# Patient Record
Sex: MALE | Race: WHITE
[De-identification: names, ages, dates, MRNs, and addresses within clinical notes are randomized per-mention and may not be internally consistent; named-entity substitution may affect disease eponyms.]

---

## 2017-02-27 ENCOUNTER — HOSPITAL ENCOUNTER (OUTPATIENT)
Age: 22
End: 2017-02-27
Payer: MEDICAID

## 2017-02-27 DIAGNOSIS — R30.0: Primary | ICD-10-CM

## 2017-07-25 ENCOUNTER — HOSPITAL ENCOUNTER (EMERGENCY)
Dept: HOSPITAL 76 - ED | Age: 22
LOS: 1 days | Discharge: HOME | End: 2017-07-26
Payer: MEDICAID

## 2017-07-25 DIAGNOSIS — R39.14: ICD-10-CM

## 2017-07-25 DIAGNOSIS — R33.9: Primary | ICD-10-CM

## 2017-07-25 LAB
CUL URINE ADD CHARGE: (no result)
PH UR STRIP.AUTO: 6.5 PH (ref 5–7.5)
SP GR UR STRIP.AUTO: 1.02 (ref 1–1.03)
UA CHARGE (STRIP ONLY): (no result)
UA W/ MICROSCOPIC CHARGE: YES
UROBILINOGEN UR STRIP.AUTO-MCNC: NEGATIVE MG/DL

## 2017-07-25 PROCEDURE — 81003 URINALYSIS AUTO W/O SCOPE: CPT

## 2017-07-25 PROCEDURE — 87086 URINE CULTURE/COLONY COUNT: CPT

## 2017-07-25 PROCEDURE — 51798 US URINE CAPACITY MEASURE: CPT

## 2017-07-25 PROCEDURE — 99283 EMERGENCY DEPT VISIT LOW MDM: CPT

## 2017-07-25 PROCEDURE — 87077 CULTURE AEROBIC IDENTIFY: CPT

## 2017-07-25 PROCEDURE — 81001 URINALYSIS AUTO W/SCOPE: CPT

## 2017-07-25 NOTE — ED PHYSICIAN DOCUMENTATION
PD HPI MALE 





- Stated complaint


Stated Complaint: MALE 





- Chief complaint


Chief Complaint: UTI





- History obtained from


History obtained from: Patient





- History of Present Illness


Timing - onset: How many weeks ago (he has had difficulty urinating with weaker 

stream and dribbling/ some nocturnal incontinence for past 2 1/2 years and 

prior to that. Had these symptoms prior to that and had surgery by Urology at 

[facility] for some scar tissue in urethra. He denies UTIs nor STDs prior to 

that. He says he did not urinate strongly even after surgery. However has had 

increased pains and straining with urination the past several months. Feeling 

narvaez bladder the past week or so, with burning on urination.)


Timing - duration: Weeks


Timing - details: Gradual onset, Still present, Waxing and waning


Associated symptoms: Dysuria, Urinary frequency, Unable to urinate (withy 

dribbling and then incontinence during sleep. He says he is having some raw 

skin in crural and underside scrotal area from wetness. He is not using any 

ointment/creams.).  No: Genital sore / lesion, Testiclar pain, Scrotal swelling


PD HPI MALE  CONTRIB FACTORS: Not sexually active.  No: Exposed to STD


Similar symptoms before: Diagnosis (urethral strictures)


Recently seen: Surgery (about 2 1/2 years ago at [facility].)





Review of Systems


Constitutional: denies: Fever, Chills


GI: denies: Vomiting, Diarrhea


: reports: Dysuria, Frequency, Incontinent


Skin: reports: Rash (he says some redness in crural and scrotal area when has 

wetness overnight from incontinence.).  denies: Lesions


Musculoskeletal: denies: Back pain





PD PAST MEDICAL HISTORY





- Past Medical History


: Retention (due to urethral strictures)





- Past Surgical History


Past Surgical History: No





- Present Medications


Home Medications: 


 Ambulatory Orders











 Medication  Instructions  Recorded  Confirmed


 


Clotrimazole/Betamethasone Dip 1 applic TP BID #15 cream..g. 07/25/17 





[Clotrimazole-Betamethasone Crm]   


 


Phenazopyridine [Pyridium] 200 mg PO TID PRN #15 tablet 07/25/17 


 


Sulfamethox/Trimeth 800/160 1 each PO BID #14 tablet 07/25/17 





[Bactrim Ds 800/160]   














- Allergies


Allergies/Adverse Reactions: 


 Allergies











Allergy/AdvReac Type Severity Reaction Status Date / Time


 


No Known Drug Allergies Allergy   Verified 06/29/13 01:39














- Social History


Does the pt smoke?: No


Smoking Status: Never smoker


Does the pt drink ETOH?: No


Does the pt have substance abuse?: No





- Immunizations


Immunizations are current?: Yes





- POLST


Patient has POLST: No





PD ED PE NORMAL





- Vitals


Vital signs reviewed: Yes





- General


General: Alert and oriented X 3, No acute distress, Well developed/nourished





- Abdomen


Abdomen: Normal bowel sounds, Soft, Non tender, Non distended, No organomegaly





- Male 


Male : Pt declined (he is very shy and embarrassed about having genital exam)





- Derm


Derm: Normal color, Warm and dry





- Neuro


Neuro: Alert and oriented X 3, No motor deficit, Normal speech





Results





- Vitals


Vitals: 


 Vital Signs - 24 hr











  07/25/17





  21:54


 


Temperature 36.6 C


 


Heart Rate 91


 


Respiratory 18





Rate 


 


Blood Pressure 133/63 H


 


O2 Saturation 100








 Oxygen











O2 Source                      Room air

















PD MEDICAL DECISION MAKING





- ED course


Complexity details: reviewed results (bladder scanner shows 300 ml and he voids 

only about 50-60 ml. However it does sound rather chronic, and he has strictures

, so would rather not place cantu if he is able to empty enough. Will refer to 

[facility] [place], since he is older than [facility] at this time. ), considered 

differential, d/w patient





Departure





- Departure


Disposition: 01 Home, Self Care


Clinical Impression: 


 Urinary retention with incomplete bladder emptying





UTI (urinary tract infection)


Qualifiers:


 Urinary tract infection type: acute cystitis Hematuria presence: without 

hematuria Qualified Code(s): N30.00 - Acute cystitis without hematuria


Condition: Stable


Record reviewed to determine appropriate education?: Yes


Instructions:  ED Retention Urinary Male, ED UTI Cystitis Male


Follow-Up: 


[facility] [Provider Group]


Prescriptions: 


Sulfamethox/Trimeth 800/160 [Bactrim Ds 800/160] 1 each PO BID #14 tablet


Clotrimazole/Betamethasone Dip [Clotrimazole-Betamethasone Crm] 1 applic TP BID 

#15 cream..g.


Phenazopyridine [Pyridium] 200 mg PO TID PRN #15 tablet


 PRN Reason: Pain


Comments: 


Increase water intake for better hydration. Bactrim twice daily for a week for 

likely infection. Pyridium to help with discomfort of urination. You are not 

emptying your bladder well, but still getting some out (the leaking and 

dribbling) so I would prefer not placing catheter due to the scarring/etc. Call 

Urology office in the morning for an appt in the next few days. Use Lotrisone 

cream for the scrotal rash then cover over that with protective ointment such 

as A&D.

## 2017-07-26 VITALS — SYSTOLIC BLOOD PRESSURE: 127 MMHG | DIASTOLIC BLOOD PRESSURE: 78 MMHG

## 2017-07-26 LAB
UR CULTURE IF IND: (no result)
WBC # UR MANUAL: >25 /HPF (ref 0–3)

## 2017-11-13 ENCOUNTER — HOSPITAL ENCOUNTER (OUTPATIENT)
Dept: HOSPITAL 76 - LAB | Age: 22
Discharge: HOME | End: 2017-11-13
Attending: UROLOGY
Payer: MEDICAID

## 2017-11-13 DIAGNOSIS — N35.9: Primary | ICD-10-CM

## 2017-11-13 LAB
CUL URINE ADD CHARGE: (no result)
PH UR STRIP.AUTO: 6.5 PH (ref 5–7.5)
SP GR UR STRIP.AUTO: 1.02 (ref 1–1.03)
UA CHARGE (STRIP ONLY): (no result)
UA W/ MICROSCOPIC CHARGE: YES
UR CULTURE IF IND: (no result)
UROBILINOGEN UR STRIP.AUTO-MCNC: NEGATIVE MG/DL

## 2017-11-13 PROCEDURE — 81001 URINALYSIS AUTO W/SCOPE: CPT

## 2017-11-13 PROCEDURE — 87086 URINE CULTURE/COLONY COUNT: CPT

## 2017-11-13 PROCEDURE — 81003 URINALYSIS AUTO W/O SCOPE: CPT

## 2020-03-13 ENCOUNTER — HOSPITAL ENCOUNTER (EMERGENCY)
Dept: HOSPITAL 76 - ED | Age: 25
Discharge: HOME | End: 2020-03-13
Payer: MEDICAID

## 2020-03-13 VITALS — DIASTOLIC BLOOD PRESSURE: 95 MMHG | SYSTOLIC BLOOD PRESSURE: 155 MMHG

## 2020-03-13 DIAGNOSIS — S52.022A: Primary | ICD-10-CM
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
## 2020-03-13 DIAGNOSIS — F17.200: ICD-10-CM

## 2020-03-13 PROCEDURE — 29105 APPLICATION LONG ARM SPLINT: CPT

## 2020-03-13 PROCEDURE — 99284 EMERGENCY DEPT VISIT MOD MDM: CPT

## 2020-03-13 PROCEDURE — 73080 X-RAY EXAM OF ELBOW: CPT

## 2020-03-13 PROCEDURE — 99283 EMERGENCY DEPT VISIT LOW MDM: CPT

## 2020-03-13 NOTE — XRAY REPORT
Reason:  injury

Procedure Date:  03/13/2020   

Accession Number:  [id number] / [id number]                    

Procedure:  XR  - Elbow 3 View LT CPT Code:  

 

***Final Report***

 

 

FULL RESULT:

 

 

EXAM:

LEFT ELBOW RADIOGRAPHY

 

EXAM DATE: 3/13/2020 04:15 PM.

 

CLINICAL HISTORY: Injury.

 

COMPARISON: None.

 

TECHNIQUE: 3 views.

 

FINDINGS:

Bones: There is an intra-articular fracture with combination of the 

proximal ulna at olecranon. The fracture is displaced by approximately 4 

mm. The radius appears intact.

 

Joints: Positive for an elbow joint effusion. No dislocation.

 

Soft Tissues: There is soft tissue swelling around the elbow.

IMPRESSION:

1. Intra-articular fracture of proximal ulna at olecranon with 4 mm of 

displacement and joint effusion.

 

RADIA

## 2020-03-30 ENCOUNTER — HOSPITAL ENCOUNTER (OUTPATIENT)
Dept: HOSPITAL 76 - SDS | Age: 25
Discharge: HOME | End: 2020-03-30
Attending: ORTHOPAEDIC SURGERY
Payer: MEDICAID

## 2020-03-30 VITALS — SYSTOLIC BLOOD PRESSURE: 118 MMHG | DIASTOLIC BLOOD PRESSURE: 74 MMHG

## 2020-03-30 DIAGNOSIS — F17.200: ICD-10-CM

## 2020-03-30 DIAGNOSIS — S52.032A: Primary | ICD-10-CM
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
## 2020-03-30 PROCEDURE — 24685 OPTX ULNAR FX PROX END W/FIX: CPT

## 2020-03-30 PROCEDURE — 0PSL04Z REPOSITION LEFT ULNA WITH INTERNAL FIXATION DEVICE, OPEN APPROACH: ICD-10-PCS | Performed by: ORTHOPAEDIC SURGERY

## 2020-03-30 NOTE — IMMEDIATE POSTOPERATIVE NOTE
Immediate Postoperative Note





- Procedure Note


Procedure Date: 03/30/20


Pre-Op Diagnosis: Left olecranon fracture


Procedure: 


Left olecranon open reduction internal fixation





Post-Op Diagnosis: Same


Primary Surgeon: PARMINDER Moreira MD


Assistant: None


Anesthesia Type: General ET tube, Regional block


Findings: 


Left olecranon fracture with some minimal comminution at the joint level





Complications: No complications


Estimated Blood Loss (in cc): 25


Drains, Catheters, Devices: 


Acumed 7-hole olecranon plate left, Multiple screws 2.7, 3.0, 3.5





Specimens and Cultures: 


None





Plan of Care: 


Patient Tolerated procedure well instrument and sponge counts correct patient 

transferred to recovery room in stable condition

## 2020-03-30 NOTE — XRAY REPORT
Reason:  ORIF LEFT ELBOW

Procedure Date:  03/30/2020   

Accession Number:  [id number] / [id number]                    

Procedure:  FL  - OR C-Arm Procedure CPT Code:  

 

***Final Report***

 

 

FULL RESULT:

 

 

EXAM:

FLUOROSCOPIC GUIDANCE

 

EXAM DATE: 3/30/2020 10:12 AM.

 

CLINICAL HISTORY: ORIF LEFT ELBOW.

 

COMPARISON: ELBOW 3 VIEW LT 03/13/2020 3:49 PM.

 

FINDINGS: Status post interval ORIF of the proximal ulnar fracture with 

dorsal sideplate and multiple screws in gross anatomic alignment based on 

these limited views.

IMPRESSION: Fluoroscopic guidance provided for left elbow ORIF. Total 

fluoroscopy time: 0.4 minutes.  Number of images: 2.

 

RADIA

## 2020-03-30 NOTE — ANESTHESIA
Pre-Anesthesia VS, & Labs





- Diagnosis


L elbow fx





- Procedure





L elbow ORIF


Vital Signs: 





                                        











Temp Pulse Resp BP Pulse Ox


 


 36.5 C   96   16   114/76   98 


 


 03/30/20 06:27  03/30/20 06:27  03/30/20 06:27  03/30/20 06:27  03/30/20 06:27














                                        





Height                           5 ft 11 in


Weight (kg)                      81 kg


Body Mass Index                  23.7











- NPO


>8 hours





Home Medications and Allergies


Allergies/Adverse Reactions: 


                                    Allergies











Allergy/AdvReac Type Severity Reaction Status Date / Time


 


No Known Drug Allergies Allergy   Verified 03/13/20 15:50














Anes History & Medical History





- Anesthetic History


Anesthesia Complications: reports: No previous complications


Family history of Anesthesia Complications: Denies


Family history of Malignant Hyperthermia: Denies





- Medical History


Cardiovascular: reports: None


Pulmonary: reports: None


Urinary: reports: None


Musculoskeletal: reports: None


Endocrine/Autoimmune: reports: None


Smoking Status: Current every day smoker





- Surgical History


Other Past Surgical History: urethral resection





Exam


General: Alert, Oriented x3, Cooperative


Dental: WNL


Mouth Opening: 3 Fingerbreadth


Neck Mobility: Normal


Mallampati classification: II


Thyromental Distance: 4-6 cm


Respiratory: Lungs clear, Normal breath sounds, No respiratory distress


Cardiovascular: Regular rate


Neurological: Normal speech


Mental/Cognitive Status: Alert/Oriented X3, Normal for patient


Cognitive Status: Within normal limits





Plan


Anesthesia Type: General, Supraclavicular Block


Consent for Procedure(s) Verified and Reviewed: Yes


Code Status: Attempt Resuscitation


ASA classification: 2-Mild systemic disease


Is this case an emergency?: No

## 2020-04-16 ENCOUNTER — HOSPITAL ENCOUNTER (OUTPATIENT)
Dept: HOSPITAL 76 - DI | Age: 25
Discharge: HOME | End: 2020-04-16
Attending: ORTHOPAEDIC SURGERY
Payer: MEDICAID

## 2020-04-16 DIAGNOSIS — S52.032D: Primary | ICD-10-CM

## 2020-04-16 NOTE — XRAY REPORT
Reason:  ELBOW FX

Procedure Date:  04/16/2020   

Accession Number:  [id number] / [id number]                    

Procedure:  XR  - Elbow 2 View LT CPT Code:  

 

***Final Report***

 

 

FULL RESULT:

 

 

EXAM:

LEFT ELBOW RADIOGRAPHY

 

EXAM DATE: 4/16/2020 09:46 AM.

 

CLINICAL HISTORY: Elbow fracture.

 

COMPARISON: ELBOW 3 VIEW LT 03/13/2020 3:49 PM.

 

TECHNIQUE: 2 views.

 

FINDINGS:

 

Interval screw/plate fixation at the proximal ulna to stabilize and 

reduce fracture. Alignment appears normal. No dislocation or subluxation 

identified.

IMPRESSION: Postsurgical changes at the left elbow, with normal alignment.

 

RADIA

## 2020-05-29 ENCOUNTER — HOSPITAL ENCOUNTER (OUTPATIENT)
Dept: HOSPITAL 76 - DI.WCP | Age: 25
Discharge: HOME | End: 2020-05-29
Attending: ORTHOPAEDIC SURGERY
Payer: MEDICAID

## 2020-05-29 DIAGNOSIS — R93.89: ICD-10-CM

## 2020-05-29 DIAGNOSIS — S52.032D: Primary | ICD-10-CM

## 2020-05-30 NOTE — XRAY REPORT
Reason:  LEFT ARM FRACTURE

Procedure Date:  05/29/2020   

Accession Number:  [id number] / [id number]                    

Procedure:  WCP - Forearm LT CPT Code:  

 

***Final Report***

 

 

FULL RESULT:

 

 

EXAM:

LEFT FOREARM RADIOGRAPHY

 

EXAM DATE: 5/29/2020 03:32 PM.

 

CLINICAL HISTORY: Follow-up left arm fracture.

 

COMPARISON: Left elbow radiographs from 04/16/2020.

 

TECHNIQUE: 2 views.

 

FINDINGS:

Bones: There are postoperative changes from fixation of olecranon 

fracture. A posterior buttress plate and multiple fixation screws are 

present. Fracture alignment is within normal limits. There is subtle 

lucency through the olecranon, near the level of a fixation screw, which 

may represent the fracture line. New, acute fracture is demonstrate.

 

Joints: Alignment is within normal limits. There is no evidence of elbow 

joint effusion.

 

Soft Tissues: Posterior to the elbow, there are coarse calcifications, 

which are new from the prior examination.

IMPRESSION:

1. Postsurgical changes from ORIF of left olecranon fracture. Hardware is 

intact, and alignment is within normal limits. Fracture remains faintly 

visible.

2. New soft tissue calcifications posterior to the elbow, which may be 

dystrophic or sequela of calcific tendinitis.

 

RADIA

## 2020-06-18 ENCOUNTER — HOSPITAL ENCOUNTER (EMERGENCY)
Dept: HOSPITAL 76 - ED | Age: 25
Discharge: HOME | End: 2020-06-18
Payer: MEDICAID

## 2020-06-18 VITALS — SYSTOLIC BLOOD PRESSURE: 144 MMHG | DIASTOLIC BLOOD PRESSURE: 87 MMHG

## 2020-06-18 DIAGNOSIS — L73.9: Primary | ICD-10-CM

## 2020-06-18 DIAGNOSIS — F17.200: ICD-10-CM

## 2020-06-18 PROCEDURE — 99282 EMERGENCY DEPT VISIT SF MDM: CPT

## 2020-06-18 PROCEDURE — 99283 EMERGENCY DEPT VISIT LOW MDM: CPT

## 2020-06-18 NOTE — ED PHYSICIAN DOCUMENTATION
History of Present Illness





- Stated complaint


Stated Complaint: OPEN SORES





- History obtained from


History obtained from: Patient (Patient is a 25-year-old male with multiple skin

lesions.  The patient thinks that he has MRSA.  He denies any history of MRSA 

denies IV drug abuse denies fevers headache or any neck pain.)





Review of Systems


Constitutional: reports: Reviewed and negative


Eyes: reports: Reviewed and negative


Ears: reports: Reviewed and negative


Nose: reports: Reviewed and negative


Throat: reports: Reviewed and negative


Cardiac: reports: Reviewed and negative


Respiratory: reports: Reviewed and negative


GI: reports: Reviewed and negative


: reports: Reviewed and negative


Skin: reports: Lesions


Musculoskeletal: reports: Reviewed and negative


Neurologic: reports: Reviewed and negative


Psychiatric: reports: Reviewed and negative


Endocrine: reports: Reviewed and negative


Immunocompromised: reports: Reviewed and negative





PD PAST MEDICAL HISTORY





- Past Medical History


Cardiovascular: None


Respiratory: None


Endocrine/Autoimmune: None


: None


Psych: None, Anxiety, Panic attacks, ADD/ADHD


Musculoskeletal: None





- Past Surgical History


Past Surgical History: Yes





- Present Medications


Home Medications: 


                                Ambulatory Orders











 Medication  Instructions  Recorded  Confirmed


 


Cephalexin [Keflex] 500 mg PO QID 10 Days #40 capsule 06/18/20 














- Allergies


Allergies/Adverse Reactions: 


                                    Allergies











Allergy/AdvReac Type Severity Reaction Status Date / Time


 


No Known Drug Allergies Allergy   Verified 06/18/20 21:57














- Social History


Does the pt smoke?: Yes


Smoking Status: Current every day smoker


Does the pt drink ETOH?: No


Does the pt have substance abuse?: No





- Immunizations


Immunizations are current?: Yes





- POLST


Patient has POLST: No





PD ED PE NORMAL





- Vitals


Vital signs reviewed: Yes





- General


General: Alert and oriented X 3, No acute distress





- HEENT


HEENT: PERRL





- Neck


Neck: Supple, no meningeal sign





- Cardiac


Cardiac: RRR, No murmur





- Respiratory


Respiratory: Clear bilaterally





- Abdomen


Abdomen: Normal bowel sounds, Soft, Non tender, Non distended





- Derm


Derm: Warm and dry, Other (Multiple skin lesions at different stages of healing 

on bilateral upper extremities and lower extremities and anterior posterior stro

ng the majority of them are approximately 5 mm With erythema and purulent 

discharge.No streaking, no fluctuance, no induration, no crepitance)





- Extremities


Extremities: No deformity





- Neuro


Neuro: Alert and oriented X 3





- Psych


Psych: Normal mood, Normal affect





Results





- Vitals


Vitals: 


                               Vital Signs - 24 hr











  06/18/20





  21:55


 


Temperature 37.1 C


 


Heart Rate 100


 


Respiratory 17





Rate 


 


Blood Pressure 144/87 H


 


O2 Saturation 100








                                     Oxygen











O2 Source                      Room air

















PD MEDICAL DECISION MAKING





- ED course


Complexity details: re-evaluated patient, considered differential (Exams 

consistent with folliculitis that is diffuse.  Will initiate treatment with oral

 Keflex.), d/w patient





Departure





- Departure


Disposition: 01 Home, Self Care


Clinical Impression: 


 Folliculitis





Condition: Stable


Instructions:  ED Folliculitis


Follow-Up: 


Miguel Delacruz MD [Provider Admit Priv/Credential] - Tomorrow


Prescriptions: 


Cephalexin [Keflex] 500 mg PO QID 10 Days #40 capsule


Comments: 


take antibiotics as directed. establish care with a primary care provider. 


Discharge Date/Time: 06/18/20 22:17

## 2020-07-19 ENCOUNTER — HOSPITAL ENCOUNTER (EMERGENCY)
Dept: HOSPITAL 76 - ED | Age: 25
Discharge: LEFT BEFORE DISCHARGE | End: 2020-07-19
Payer: MEDICAID

## 2020-07-19 VITALS — SYSTOLIC BLOOD PRESSURE: 138 MMHG | DIASTOLIC BLOOD PRESSURE: 83 MMHG

## 2020-07-19 DIAGNOSIS — Z53.21: Primary | ICD-10-CM

## 2021-09-27 ENCOUNTER — HOSPITAL ENCOUNTER (EMERGENCY)
Dept: HOSPITAL 76 - ED | Age: 26
Discharge: HOME | End: 2021-09-27
Payer: MEDICAID

## 2021-09-27 VITALS — SYSTOLIC BLOOD PRESSURE: 142 MMHG | DIASTOLIC BLOOD PRESSURE: 88 MMHG

## 2021-09-27 DIAGNOSIS — F17.200: ICD-10-CM

## 2021-09-27 DIAGNOSIS — R59.0: ICD-10-CM

## 2021-09-27 DIAGNOSIS — J35.1: Primary | ICD-10-CM

## 2021-09-27 PROCEDURE — 99282 EMERGENCY DEPT VISIT SF MDM: CPT

## 2021-09-27 PROCEDURE — 99283 EMERGENCY DEPT VISIT LOW MDM: CPT

## 2021-09-27 NOTE — ED PHYSICIAN DOCUMENTATION
PD HPI HEENT





- Stated complaint


Stated Complaint: LT SIDE NECK SWELLING





- Chief complaint


Chief Complaint: Heent





- History obtained from


History obtained from: Patient





- History of Present Illness


Timing - onset: How many weeks ago (2)


Timing - duration: Weeks (2)


Timing - details: Gradual onset, Still present


Location: Throat (just recent left tonsillar area pain 2 days.), Other (has 

swelling that is tender left side of neck for 2 weeks. Hving some left throat 

pain the past 2 days.)


Worsens: Swalllowing


Associated symptoms: No: Fever, Congestion, Facial swelling, Cough


Similar symptoms before: Has not had sx before


Recently seen: Not recently seen





Review of Systems


Constitutional: denies: Fever, Chills


Nose: denies: Rhinorrhea / runny nose, Congestion


Throat: reports: Sore throat, Swollen tonsils (left side)


Respiratory: denies: Cough


GI: denies: Nausea, Vomiting, Diarrhea


Skin: denies: Rash, Lesions





PD PAST MEDICAL HISTORY





- Past Medical History


Cardiovascular: None


Respiratory: None


Endocrine/Autoimmune: None


: None


Psych: None, Anxiety, Panic attacks, ADD/ADHD


Musculoskeletal: None





- Past Surgical History


Past Surgical History: Yes





- Present Medications


Home Medications: 


                                Ambulatory Orders











 Medication  Instructions  Recorded  Confirmed


 


Ibuprofen [Motrin] 600 mg PO TID PRN #20 tab 09/27/21 


 


cephALEXin [Keflex] 500 mg PO TID #20 cap 09/27/21 














- Allergies


Allergies/Adverse Reactions: 


                                    Allergies











Allergy/AdvReac Type Severity Reaction Status Date / Time


 


No Known Drug Allergies Allergy   Verified 09/27/21 05:35














- Social History


Does the pt smoke?: Yes


Smoking Status: Current every day smoker


Does the pt drink ETOH?: No


Does the pt have substance abuse?: No





- Immunizations


Immunizations are current?: Yes





- POLST


Patient has POLST: No





PD ED PE NORMAL





- Vitals


Vital signs reviewed: Yes





- General


General: Alert and oriented X 3, No acute distress, Well developed/nourished





- HEENT


HEENT: Ears normal, Moist mucous membranes.  No: Pharynx benign (left tonsil 

with some mild swelling. White concretion in one of the crypts. No exudate per 

se. No peritonsillar swelling. )





- Neck


Neck: Supple, no meningeal sign, No bony TTP, Other (left anterior neck with 2 

cm tender lump without fluctuance nor skin lesions, c/w lymph node. )





- Cardiac


Cardiac: RRR, No murmur





- Respiratory


Respiratory: Clear bilaterally





- Derm


Derm: Normal color, Warm and dry, No rash





Results





- Vitals


Vitals: 


                                     Oxygen











O2 Source                      Room air

















Departure





- Departure


Disposition: 01 Home, Self Care


Clinical Impression: 


 Anterior cervical adenopathy, Swelling of tonsil





Condition: Stable


Record reviewed to determine appropriate education?: Yes


Instructions:  ED Cervical Adenitis Abx Tx


Prescriptions: 


cephALEXin [Keflex] 500 mg PO TID #20 cap


Ibuprofen [Motrin] 600 mg PO TID PRN #20 tab


 PRN Reason: Pain


Comments: 


Cephalexin three times daily for a week for infection. Ibuprofen also 3 times 

daily for a week for inflammation. Follow up dentist regarding the tissue 

impactions of the wisdom teeth. The enlarged lymph node in the neck may be 

reacting to some smoldering gum infection or in the tonsils. 





Recheck if swelling has not improved over the next seveeral days to a week. 





I transmitted your prescriptions to [facility]. 


Discharge Date/Time: 09/27/21 06:44
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
## 2023-07-06 ENCOUNTER — HOSPITAL ENCOUNTER (EMERGENCY)
Dept: HOSPITAL 76 - ED | Age: 28
Discharge: HOME | End: 2023-07-06
Payer: MEDICAID

## 2023-07-06 VITALS — DIASTOLIC BLOOD PRESSURE: 72 MMHG | SYSTOLIC BLOOD PRESSURE: 128 MMHG

## 2023-07-06 DIAGNOSIS — L08.9: ICD-10-CM

## 2023-07-06 DIAGNOSIS — S90.822A: Primary | ICD-10-CM
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
## 2023-07-06 DIAGNOSIS — S90.821A: ICD-10-CM

## 2023-07-06 DIAGNOSIS — F17.200: ICD-10-CM

## 2023-07-06 DIAGNOSIS — S90.425A: ICD-10-CM

## 2023-07-06 DIAGNOSIS — S90.424A: ICD-10-CM

## 2023-07-06 PROCEDURE — 99281 EMR DPT VST MAYX REQ PHY/QHP: CPT

## 2023-07-06 PROCEDURE — 99283 EMERGENCY DEPT VISIT LOW MDM: CPT

## 2023-07-06 NOTE — ED PHYSICIAN DOCUMENTATION
PD HPI SKIN





- Stated complaint


Stated Complaint: BILAT FEET INFECTION





- Chief complaint


Chief Complaint: Wound





- History obtained from


History obtained from: Patient





- Additional information


Additional information: 





28yM previously healthy with FH autoimmune disease p/w BL foot and toe blisters 

X 2-3 days with foot pain and swelling. denies fever. ambulatory without 

difficulty. does state he was wearing the same socks for two days straight a 

couple days ago and they may have become slightly damp with sweat. denies injury

or other issues.





PD PAST MEDICAL HISTORY





- Past Medical History


Cardiovascular: None


Respiratory: None


Endocrine/Autoimmune: None


: None


Psych: None, Anxiety, Panic attacks, ADD/ADHD


Musculoskeletal: None





- Past Surgical History


Past Surgical History: Yes





- Present Medications


Home Medications: 


                                Ambulatory Orders











 Medication  Instructions  Recorded  Confirmed


 


Ibuprofen [Motrin] 600 mg PO TID PRN #20 tab 09/27/21 


 


cephALEXin [Keflex] 500 mg PO TID #20 cap 09/27/21 


 


cephALEXin [Keflex] 500 mg PO Q6H #28 tab 07/06/23 














- Allergies


Allergies/Adverse Reactions: 


                                    Allergies











Allergy/AdvReac Type Severity Reaction Status Date / Time


 


No Known Drug Allergies Allergy   Verified 07/06/23 19:13














- Social History


Does the pt smoke?: Yes


Smoking Status: Current every day smoker


Does the pt drink ETOH?: No


Does the pt have substance abuse?: No





- Immunizations


Immunizations are current?: Yes





- POLST


Patient has POLST: No





PD ED PE NORMAL





- Vitals


Vital signs reviewed: Yes





- General


General: Alert and oriented X 3, No acute distress, Well developed/nourished





- HEENT


HEENT: Atraumatic, PERRL, EOMI





- Derm


Derm: Normal color, Warm and dry, Other (cracks between each toe. open sores 

between several toes of R foot and some toes of L foot. no gross purulence. mild

 swelling and erythema to dorsum of R foot)





- Extremities


Extremities: Other (2+ DP pulses BL. normal sensation)





Results





- Vitals


Vitals: 





                               Vital Signs - 24 hr











  07/06/23 07/06/23





  19:07 19:13


 


Temperature 36.6 C 36.6 C


 


Heart Rate 101 H 101 H


 


Respiratory 16 16





Rate  


 


Blood Pressure 130/76 130/76


 


O2 Saturation 100 100








                                     Oxygen











O2 Source                      Room air

















PD Medical Decision Making





- ED course


ED course: 





28-year-old man, previously healthy, presents to the ED with sores to bilateral 

toes and feet with some redness and clear weeping discharge. he is not having 

systemic fever but it does look infected. referral provided for PCP. keflex rx 

provided. return precautions given. 





Departure





- Departure


Disposition: 01 Home, Self Care


Clinical Impression: 


 Foot and toe(s), blister, infected





Condition: Stable


Instructions:  ED Blister


Follow-Up: 


Grace El MD [Provider Admit Priv/Credential] - 


Prescriptions: 


cephALEXin [Keflex] 500 mg PO Q6H #28 tab


Comments: 


You were seen in the emergency department for infected blisters of your feet.  

Please follow-up with a primary care provider (referral enclosed to Dr. El).  Prescription for the antibiotic Keflex was electronically sent to 

[facility] pharmacy. Return to the emergency department for new or worsening 

symptoms or other concerns.